# Patient Record
Sex: FEMALE | Race: WHITE | URBAN - METROPOLITAN AREA
[De-identification: names, ages, dates, MRNs, and addresses within clinical notes are randomized per-mention and may not be internally consistent; named-entity substitution may affect disease eponyms.]

---

## 2017-01-10 ENCOUNTER — THERAPY VISIT (OUTPATIENT)
Dept: PHYSICAL THERAPY | Facility: CLINIC | Age: 27
End: 2017-01-10
Payer: COMMERCIAL

## 2017-01-10 DIAGNOSIS — M25.552 HIP PAIN, LEFT: Primary | ICD-10-CM

## 2017-01-10 PROCEDURE — 97530 THERAPEUTIC ACTIVITIES: CPT | Mod: GP | Performed by: PHYSICAL THERAPIST

## 2017-01-10 PROCEDURE — 97110 THERAPEUTIC EXERCISES: CPT | Mod: GP | Performed by: PHYSICAL THERAPIST

## 2017-01-10 PROCEDURE — 97161 PT EVAL LOW COMPLEX 20 MIN: CPT | Mod: GP | Performed by: PHYSICAL THERAPIST

## 2017-01-10 ASSESSMENT — ACTIVITIES OF DAILY LIVING (ADL)
HOW_WOULD_YOU_RATE_YOUR_CURRENT_LEVEL_OF_FUNCTION_DURING_YOUR_USUAL_ACTIVITIES_OF_DAILY_LIVING_FROM_0_TO_100_WITH_100_BEING_YOUR_LEVEL_OF_FUNCTION_PRIOR_TO_YOUR_HIP_PROBLEM_AND_0_BEING_THE_INABILITY_TO_PERFORM_ANY_OF_YOUR_USUAL_DAILY_ACTIVITIES?: 70
HOS_ADL_HIGHEST_POTENTIAL_SCORE: 64
PUTTING_ON_SOCKS_AND_SHOES: SLIGHT DIFFICULTY
STANDING_FOR_15_MINUTES: SLIGHT DIFFICULTY
DEEP_SQUATTING: MODERATE DIFFICULTY
GETTING_INTO_AND_OUT_OF_AN_AVERAGE_CAR: NO DIFFICULTY AT ALL
WALKING_DOWN_STEEP_HILLS: NO DIFFICULTY AT ALL
LIGHT_TO_MODERATE_WORK: NO DIFFICULTY AT ALL
STEPPING_UP_AND_DOWN_CURBS: NO DIFFICULTY AT ALL
HOS_ADL_COUNT: 16
WALKING_15_MINUTES_OR_GREATER: NO DIFFICULTY AT ALL
WALKING_UP_STEEP_HILLS: NO DIFFICULTY AT ALL
GOING_UP_1_FLIGHT_OF_STAIRS: NO DIFFICULTY AT ALL
GOING_DOWN_1_FLIGHT_OF_STAIRS: NO DIFFICULTY AT ALL
RECREATIONAL_ACTIVITIES: NO DIFFICULTY AT ALL
TWISTING/PIVOTING_ON_INVOLVED_LEG: SLIGHT DIFFICULTY
HOS_ADL_SCORE(%): 92.19
WALKING_INITIALLY: NO DIFFICULTY AT ALL
WALKING_APPROXIMATELY_10_MINUTES: NO DIFFICULTY AT ALL
HEAVY_WORK: SLIGHT DIFFICULTY
GETTING_INTO_AND_OUT_OF_A_BATHTUB: NO DIFFICULTY AT ALL
HOS_ADL_ITEM_SCORE_TOTAL: 59
SITTING_FOR_15_MINUTES: SLIGHT DIFFICULTY

## 2017-01-10 NOTE — PROGRESS NOTES
Physical Therapy Initial Examination/Evaluation  January 10, 2017    Ivis Soto is a 26 year old female referred to physical therapy by self-referred for treatment of L hip pain with Precautions/Restrictions/MD instructions E&T    Therapist Impression:   Ivis presents to therapy with pain that is consistent with hip impingement.  Her symptoms may be load related and or functional impingement related.  Our treatment focus will be on hip/core strengthening, education on lifting mechanics and periodization, and correcting single leg squat mechanics.    Subjective:  DOI/onset: Few months ago 10/1/2016  DOS: none  Acute Injury or Gradual Onset?: Gradual injury over time  Mechanism of Injury: Lifting weghts  Related PMH: Hx of hip pain with college Previous Treatment: Stretching, rest foam rolling, lacrosse ball Effect of prior treatment: fair  Imaging: None  Chief Complaint/Functional Limitations:   Weight lifting, sitting and standing pain.  Tweaked back recently as well; hx of back pain and see below in therapy evaluation codes   Pain: rest 0 /10, activity 6/10 Location: Hip flexor and ITB/lateral quad Frequency: Intermittent Described as: Sharp/jabbing Alleviated by: Rest Progression of Symptoms: Unchanged Time of day when pain is worse: Activity related  Sleeping: No issues/uninterrupted   Occupation: Manager  Job duties: prolonged sitting, keyboarding/computer use  Current HEP/exercise regimen: Bodybuilding and powerlifting 3 x week, OH presses, keep lifting heavier, biker  Patient's goals are see chief complaints     Other pertinent PMH/Red Flags: implanted device, anemia   Barriers at home/work: None as reported by patient  Pertinent Surgical History: None  Medications: None as reported by patient  General health as reported by patient: excellent  Return to MD:  prn    HIP EVALUATION    Dynamic Movement Screen  Single leg stance observations: Diffculty with balance eyes closed B  Double limb squat  observations: Good technique/no significant findings  Single limb squat observations: Knee valgus, Hip internal rotation and Contralateral hip drop    Range of Motion  Lumbar spine screen: Positive pain with flexion and stiffness with extension  SI joint screen: Negative      Hip ROM Flexion ER  IR Extension   Left wnl wnl wnl na   Right wnl wnl wnl na      Flexibility Quadriceps Hamstrings Ankle   Left wnl wnl na   Right wnl wnl na     Hip and Knee Strength   MMT Hip Abduction Hip Extension Hip ER Hip IR Knee Flexion   Left 4+/5 4+/5 na/5  na/5 na/5   Right 4+/5 4+/5 na/5  na/5 na/5   Hcun Leg Lowering 5/5    Knee MMT Quadriceps set Straight Leg Raise   Left Good Able   Right Good Able     Special Tests   FADIR HEATHER Log Roll Resisted SLR Parker Prone Figure 4 Kishan's   Left Negative Positive for loss of ROM NA NA Negative NA NA   Right Negative Negative NA NA Negative NA NA     Assessment/Plan:  Patient is a 26 year old female with left side hip complaints.    Patient has the following significant findings with corresponding treatment plan.                Diagnosis 1:  L hip pain  Pain -  hot/cold therapy, manual therapy, splint/taping/bracing/orthotics, self management, education and home program  Decreased ROM/flexibility - manual therapy and therapeutic exercise  Decreased strength - therapeutic exercise and therapeutic activities  Impaired muscle performance - neuro re-education    Therapy Evaluation Codes:   1) History comprised of:   Personal factors that impact the plan of care:      None.    Comorbidity factors that impact the plan of care are:      None.     Medications impacting care: None.  2) Examination of Body Systems comprised of:   Body structures and functions that impact the plan of care:      Pelvis.   Activity limitations that impact the plan of care are:      Lifting.  3) Clinical presentation characteristics are:   Stable/Uncomplicated.  4) Decision-Making    Low complexity using  standardized patient assessment instrument and/or measureable assessment of functional outcome.  Cumulative Therapy Evaluation is: Low complexity.    Previous and current functional limitations:  (See Goal Flow Sheet for this information)    Short term and Long term goals: (See Goal Flow Sheet for this information)     Communication ability:  Patient appears to be able to clearly communicate and understand verbal and written communication and follow directions correctly.  Treatment Explanation - The following has been discussed with the patient:   RX ordered/plan of care  Anticipated outcomes  Possible risks and side effects  This patient would benefit from PT intervention to resume normal activities.   Rehab potential is excellent.    Frequency:  2 X a month, once daily  Duration:  for 3 months  Discharge Plan:  Achieve all LTG.  Independent in home treatment program.  Reach maximal therapeutic benefit.    Please refer to the daily flowsheet for treatment today, total treatment time and time spent performing 1:1 timed codes.

## 2017-01-24 ENCOUNTER — THERAPY VISIT (OUTPATIENT)
Dept: PHYSICAL THERAPY | Facility: CLINIC | Age: 27
End: 2017-01-24
Payer: COMMERCIAL

## 2017-01-24 DIAGNOSIS — M25.552 HIP PAIN, LEFT: Primary | ICD-10-CM

## 2017-01-24 PROCEDURE — 97112 NEUROMUSCULAR REEDUCATION: CPT | Mod: GP | Performed by: PHYSICAL THERAPIST

## 2017-01-24 PROCEDURE — 97110 THERAPEUTIC EXERCISES: CPT | Mod: GP | Performed by: PHYSICAL THERAPIST

## 2017-02-14 ENCOUNTER — THERAPY VISIT (OUTPATIENT)
Dept: PHYSICAL THERAPY | Facility: CLINIC | Age: 27
End: 2017-02-14
Payer: COMMERCIAL

## 2017-02-14 DIAGNOSIS — M25.552 HIP PAIN, LEFT: ICD-10-CM

## 2017-02-14 PROCEDURE — 97530 THERAPEUTIC ACTIVITIES: CPT | Mod: GP | Performed by: PHYSICAL THERAPIST

## 2017-02-14 PROCEDURE — 97110 THERAPEUTIC EXERCISES: CPT | Mod: GP | Performed by: PHYSICAL THERAPIST

## 2017-02-14 NOTE — PROGRESS NOTES
Hip and Knee Strength   MMT Hip Abduction Hip Extension Hip ER Hip IR Knee Flexion   Left 4+/5 5/5 5/5  na/5 na/5   Right 4+/5 5/5 5/5  na/5 na/5

## 2017-02-14 NOTE — MR AVS SNAPSHOT
"              After Visit Summary   2/14/2017    Ivis Soto    MRN: [id number]           Patient Information     Date Of Birth          1990        Visit Information        Provider Department      2/14/2017 11:00 AM Avtar Higuera PT [facility]        Today's Diagnoses     Hip pain, left           Follow-ups after your visit        Your next 10 appointments already scheduled     Mar 07, 2017 11:00 AM CST   DUONG Extremity with Avtar Higuera PT   [facility] ( [facility])    [address] [place] [id number] [phone]              Who to contact     If you have questions or need follow up information about today's clinic visit or your schedule please contact [facility] directly at [phone].  Normal or non-critical lab and imaging results will be communicated to you by [employer], letter or phone within 4 business days after the clinic has received the results. If you do not hear from us within 7 days, please contact the clinic through MyChart or phone. If you have a critical or abnormal lab result, we will notify you by phone as soon as possible.  Submit refill requests through [employer] or call your pharmacy and they will forward the refill request to us. Please allow 3 business days for your refill to be completed.          Additional Information About Your Visit        [employer] Information     [employer] lets you send messages to your doctor, view your test results, renew your prescriptions, schedule appointments and more. To sign up, go to [email or web address] . Click on \"Log in\" on the left side of the screen, which will take you to the Welcome page. Then click on \"Sign up Now\" on the right side of the page.     You will be asked to enter the access code listed below, as well as some personal information. Please follow the directions to create your " username and password.     Your access code is: [email or web address]  Expires: 5/15/2017 12:38 PM     Your access code will  in 90 days. If you need help or a new code, please call your [place] clinic or [phone].        Care EveryWhere ID     This is your Care EveryWhere ID. This could be used by other organizations to access your [place] medical records  [id number]         Blood Pressure from Last 3 Encounters:   No data found for BP    Weight from Last 3 Encounters:   No data found for Wt              We Performed the Following     THERAPEUTIC ACTIVITIES     THERAPEUTIC EXERCISES        Primary Care Provider    None Specified       No primary provider on file.        Thank you!     Thank you for choosing [facility] PHYSICAL THERAPY [place]  for your care. Our goal is always to provide you with excellent care. Hearing back from our patients is one way we can continue to improve our services. Please take a few minutes to complete the written survey that you may receive in the mail after your visit with us. Thank you!             Your Updated Medication List - Protect others around you: Learn how to safely use, store and throw away your medicines at www.disposemymeds.org.      Notice  As of 2017 12:38 PM    You have not been prescribed any medications.

## 2017-03-21 ENCOUNTER — THERAPY VISIT (OUTPATIENT)
Dept: PHYSICAL THERAPY | Facility: CLINIC | Age: 27
End: 2017-03-21
Payer: COMMERCIAL

## 2017-03-21 DIAGNOSIS — M25.552 HIP PAIN, LEFT: ICD-10-CM

## 2017-03-21 PROCEDURE — 97110 THERAPEUTIC EXERCISES: CPT | Mod: GP | Performed by: PHYSICAL THERAPIST

## 2017-03-21 PROCEDURE — 97530 THERAPEUTIC ACTIVITIES: CPT | Mod: GP | Performed by: PHYSICAL THERAPIST

## 2017-03-21 NOTE — MR AVS SNAPSHOT
"              After Visit Summary   3/21/2017    Ivis Soto    MRN: [id number]           Patient Information     Date Of Birth          1990        Visit Information        Provider Department      3/21/2017 11:00 AM Avtar Higuera PT [facility]        Today's Diagnoses     Hip pain, left           Follow-ups after your visit        Who to contact     If you have questions or need follow up information about today's clinic visit or your schedule please contact [facility] directly at [phone].  Normal or non-critical lab and imaging results will be communicated to you by Surveypalhart, letter or phone within 4 business days after the clinic has received the results. If you do not hear from us within 7 days, please contact the clinic through Local[employer] or phone. If you have a critical or abnormal lab result, we will notify you by phone as soon as possible.  Submit refill requests through [employer] or call your pharmacy and they will forward the refill request to us. Please allow 3 business days for your refill to be completed.          Additional Information About Your Visit        MyChart Information     [employer] lets you send messages to your doctor, view your test results, renew your prescriptions, schedule appointments and more. To sign up, go to [email or web address] . Click on \"Log in\" on the left side of the screen, which will take you to the Welcome page. Then click on \"Sign up Now\" on the right side of the page.     You will be asked to enter the access code listed below, as well as some personal information. Please follow the directions to create your username and password.     Your access code is: [email or web address]  Expires: 5/15/2017  1:38 PM     Your access code will  in 90 days. If you need help or a new code, please call your [place] clinic or [phone].        Care EveryWhere ID     This is your Care EveryWhere ID. This " could be used by other organizations to access your [place] medical records  [id number]         Blood Pressure from Last 3 Encounters:   No data found for BP    Weight from Last 3 Encounters:   No data found for Wt              We Performed the Following     THERAPEUTIC ACTIVITIES     THERAPEUTIC EXERCISES        Primary Care Provider    None Specified       No primary provider on file.        Thank you!     Thank you for choosing [facility] PHYSICAL THERAPY [place]  for your care. Our goal is always to provide you with excellent care. Hearing back from our patients is one way we can continue to improve our services. Please take a few minutes to complete the written survey that you may receive in the mail after your visit with us. Thank you!             Your Updated Medication List - Protect others around you: Learn how to safely use, store and throw away your medicines at www.disposemymeds.org.      Notice  As of 3/21/2017 12:55 PM    You have not been prescribed any medications.

## 2017-03-21 NOTE — PROGRESS NOTES
PROGRESS REPORT    Ivis has been in therapy from January 10, 2017 to Mar 21, 2017 for treatment of L hip pain.    Therapist Impression:  Ivis returns to therapy doing very well.  She reports being able to perform back squats with a barbell at the gym without issues.  She feels comfortable progressing on her own and will be attending a weight lifting class in the near future.  We created a home program to emphasize strength/hypertrophy that she will follow over the next 6-8 weeks.  She will e-mail or follow up as needed.    Subjective:  Subjective: Started double leg back squats and going well.  Doing upper body exercises.  Has been travelling over the past few weeks and has been busy.      Objective:  OH squat and dead lift: no obvious findings  HEATHER ROM WNL B    Hip and Knee Strength   MMT Hip Abduction Hip Extension Hip ER Hip IR Knee Flexion   Left 5-/5 5/5 5/5  na/5 na/5   Right 5-/5 5/5 5/5  na/5 na/5        ASSESSMENT/PLAN  Updated problem list and treatment plan: The encounter diagnosis was Hip pain, left. Pain - HEP  Decreased strength - HEP  Impaired muscle performance - HEP  STG/LTGs have been met or progress has been made towards goals:  Yes (See Goal flow sheet completed today.)  Assessment of Progress: The patient's condition is improving.  The patient's condition has potential to improve.  Self Management Plans:  Patient has been instructed in a home treatment program.  Patient is independent in a home treatment program.  Patient  has been instructed in self management of symptoms.  Patient is independent in self management of symptoms.  I have re-evaluated this patient and find that the nature, scope, duration and intensity of the therapy is appropriate for the medical condition of the patient.  Ivis continues to require the following intervention to meet STG and LTG's:  PT intervention is no longer required to meet STG/LTG.    Recommendations:  This patient is ready to be discharged from therapy and  continue their home treatment program.          Please refer to the daily flowsheet for treatment today, total treatment time and time spent performing 1:1 timed codes.          Therapy Home Exercise/Activity Guidelines   Precautions:   Activity Reps/Set X/Week Goal   Targeted Muscle Strength   Single leg bridge / hip thruster 3 x 12-15 3 x week    Leg raise standing / sidelying 2 x 20 3 x week    Functional/Resisted Strength   Squats / Dead lift 2 x 15 3 x week 3 x 10-12   Divehi squats / slider lunge 2 x 15  3 x 10-12   Single leg squat  2 x 15  3 x 10-12         Core   Push ups      Pull ups      Cardio   Bike            Agility   **IN FUTURE CAN ADD IN AGILITY LADDER      Return to Sport

## 2018-10-22 ENCOUNTER — TRANSFERRED RECORDS (OUTPATIENT)
Dept: HEALTH INFORMATION MANAGEMENT | Facility: CLINIC | Age: 28
End: 2018-10-22

## 2018-11-10 ENCOUNTER — OFFICE VISIT (OUTPATIENT)
Dept: ORTHOPEDICS | Facility: CLINIC | Age: 28
End: 2018-11-10
Payer: COMMERCIAL

## 2018-11-10 ENCOUNTER — RADIANT APPOINTMENT (OUTPATIENT)
Dept: GENERAL RADIOLOGY | Facility: CLINIC | Age: 28
End: 2018-11-10
Attending: FAMILY MEDICINE
Payer: COMMERCIAL

## 2018-11-10 VITALS
HEIGHT: 64 IN | BODY MASS INDEX: 21.85 KG/M2 | DIASTOLIC BLOOD PRESSURE: 78 MMHG | SYSTOLIC BLOOD PRESSURE: 111 MMHG | HEART RATE: 79 BPM | WEIGHT: 128 LBS

## 2018-11-10 DIAGNOSIS — M25.522 LEFT ELBOW PAIN: ICD-10-CM

## 2018-11-10 DIAGNOSIS — M25.522 LEFT ELBOW PAIN: Primary | ICD-10-CM

## 2018-11-10 NOTE — MR AVS SNAPSHOT
"              After Visit Summary   11/10/2018    Ivis Soto    MRN: [id number]           Patient Information     Date Of Birth          1990        Visit Information        Provider Department      11/10/2018 9:30 AM Celso Henning DO [facility]        Today's Diagnoses     Left elbow pain    -  1       Follow-ups after your visit        Your next 10 appointments already scheduled     Nov 27, 2018  9:00 AM CST   Return Walk In Ortho with DO SUNI [facility] ([facility])    [address] [facility]  [address] [facility] [address] [phone]              Who to contact     Please call your clinic at [phone] to:    Ask questions about your health    Make or cancel appointments    Discuss your medicines    Learn about your test results    Speak to your doctor            Additional Information About Your Visit        Care EveryWhere ID     This is your Care EveryWhere ID. This could be used by other organizations to access your [place] medical records  [id number]        Your Vitals Were     Pulse Height BMI (Body Mass Index)             79 1.626 m (5' 4\") 21.97 kg/m2          Blood Pressure from Last 3 Encounters:   11/10/18 111/78    Weight from Last 3 Encounters:   11/10/18 58.1 kg (128 lb)               Primary Care Provider    None Specified       No primary provider on file.        Equal Access to Services     SANFORD REED : Shawna liang Sonaga, waaxda luqadaha, qaybta kaalmada adeegyada, oskar denney. So [facility] [phone].    ATENCIÓN: Si habla español, tiene a coley disposición servicios gratavios de asistencia lingüística. Llame al [phone].    We comply with applicable federal civil rights laws and [place] laws. We do not discriminate on the basis of race, color, national origin, age, disability, sex, sexual orientation, or gender " identity.            Thank you!     Thank you for choosing [facility] SPORTS AND ORTHOPAEDIC WALK IN CLINIC  for your care. Our goal is always to provide you with excellent care. Hearing back from our patients is one way we can continue to improve our services. Please take a few minutes to complete the written survey that you may receive in the mail after your visit with us. Thank you!             Your Updated Medication List - Protect others around you: Learn how to safely use, store and throw away your medicines at www.disposemymeds.org.          This list is accurate as of 11/10/18  3:32 PM.  Always use your most recent med list.                   Brand Name Dispense Instructions for use Diagnosis    levonorgestrel 20 MCG/24HR IUD    MIRENA     1 each by Intrauterine route once

## 2018-11-10 NOTE — LETTER
Date:November 12, 2018      Patient was self referred, no letter generated. Do not send.        [facility] Health Information
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
## 2018-11-27 ENCOUNTER — OFFICE VISIT (OUTPATIENT)
Dept: ORTHOPEDICS | Facility: CLINIC | Age: 28
End: 2018-11-27
Payer: COMMERCIAL

## 2018-11-27 ENCOUNTER — RADIANT APPOINTMENT (OUTPATIENT)
Dept: GENERAL RADIOLOGY | Facility: CLINIC | Age: 28
End: 2018-11-27
Attending: FAMILY MEDICINE
Payer: COMMERCIAL

## 2018-11-27 VITALS
DIASTOLIC BLOOD PRESSURE: 78 MMHG | SYSTOLIC BLOOD PRESSURE: 123 MMHG | HEIGHT: 64 IN | WEIGHT: 127 LBS | BODY MASS INDEX: 21.68 KG/M2

## 2018-11-27 DIAGNOSIS — M25.522 LEFT ELBOW PAIN: Primary | ICD-10-CM

## 2018-11-27 DIAGNOSIS — M25.522 LEFT ELBOW PAIN: ICD-10-CM

## 2018-11-27 NOTE — MR AVS SNAPSHOT
"              After Visit Summary   11/27/2018    Ivis Soto    MRN: [id number]           Patient Information     Date Of Birth          1990        Visit Information        Provider Department      11/27/2018 9:00 AM Celso Henning, DO [facility]        Today's Diagnoses     Left elbow pain    -  1       Follow-ups after your visit        Who to contact     Please call your clinic at [phone] to:    Ask questions about your health    Make or cancel appointments    Discuss your medicines    Learn about your test results    Speak to your doctor            Additional Information About Your Visit        Care EveryWhere ID     This is your Care EveryWhere ID. This could be used by other organizations to access your [facility] medical records  [id number]        Your Vitals Were     Height BMI (Body Mass Index)                1.626 m (5' 4\") 21.8 kg/m2           Blood Pressure from Last 3 Encounters:   11/27/18 123/78   11/10/18 111/78    Weight from Last 3 Encounters:   11/27/18 57.6 kg (127 lb)   11/10/18 58.1 kg (128 lb)              Today, you had the following     No orders found for display       Primary Care Provider    None Specified       No primary provider on file.        Equal Access to Services     [facility]: Hadii bobo Mckoy, watayoda sanjuana, qamichaelta kaalayo mcdaniel, oskar hall . So [facility] [phone].    ATENCIÓN: Si habla español, tiene a coley disposición servicios gratuitos de asistencia lingüística. Llame al [phone].    We comply with applicable federal civil rights laws and [place] laws. We do not discriminate on the basis of race, color, national origin, age, disability, sex, sexual orientation, or gender identity.            Thank you!     Thank you for choosing  [facility]  for your care. Our goal is always to provide you with excellent care. Hearing back from our " patients is one way we can continue to improve our services. Please take a few minutes to complete the written survey that you may receive in the mail after your visit with us. Thank you!             Your Updated Medication List - Protect others around you: Learn how to safely use, store and throw away your medicines at www.disposemymeds.org.          This list is accurate as of 11/27/18 10:30 AM.  Always use your most recent med list.                   Brand Name Dispense Instructions for use Diagnosis    levonorgestrel 20 MCG/24HR IUD    MIRENA     1 each by Intrauterine route once

## 2018-11-27 NOTE — PROGRESS NOTES
"ESTABLISHED PATIENT FOLLOW-UP:  RECHECK of the Left Elbow       HISTORY OF PRESENT ILLNESS  Ms. Soto is a pleasant 28 year old year old female who presents to clinic today for follow-up of left elbow pain    Date of injury: 2 weeks ago  Date last seen: 2 weeks ago  Following Therapeutic Plan: Yes  Pain: Patient has improvement of pain, mild twinges of pain at lateral elbow, radiocapitellar joint with supination and pronation, rare. No catching, locking or instability.   Function: Improved, lacking mild extension  Interval History: Patient doing well overall.  Feels pain has markedly improved over the last 2 weeks.  Flexion is full, good supination/pronation.  Mild pain of lateral elbow with supination.  Has avoided lifting, yoga, pushups and pullups.  No other issues.       MEDICAL HISTORY  Patient Active Problem List   Diagnosis   (none) - all problems resolved or deleted       Current Outpatient Prescriptions   Medication Sig Dispense Refill     levonorgestrel (MIRENA) 20 MCG/24HR IUD 1 each by Intrauterine route once         No Known Allergies    No family history on file.    Additional medical/Social/Surgical histories reviewed in [facility] and updated as appropriate.     REVIEW OF SYSTEMS (11/27/2018)  CONSTITUTIONAL: Denies fever and weight loss  EYES: Denies acute vision changes  ENT: Denies hearing changes or difficulty swallowing  CARDIAC: Denies chest pain or edema  RESPIRATORY: Denies dyspnea, cough or wheeze  GASTROINTESTINAL: Denies abdominal pain, nausea, vomiting  MUSCULOSKELETAL: See HPI  SKIN: Denies any recent rash or lesion  NEUROLOGICAL: Denies numbness or focal weakness  PSYCHIATRIC: No history of psychiatric symptoms or problems  ENDOCRINE: Denies current diagnosis of diabetes   HEMATOLOGY: Denies episodes of easy bleeding     PHYSICAL EXAM  /78  Ht 1.626 m (5' 4\")  Wt 57.6 kg (127 lb)  BMI 21.8 kg/m2    GEN: AOx4, NAD  PSYCH: appropriate mood and affect  Musculoskeletal - Left " elbow  - inspection: normal joint alignment, no obvious deformity, no swelling  - palpation: TTP at radiocapitellar joint lateral elbow. Nontender lateral or medial epicondyle. Nontender soft tissue. Nontender olecranon or medial elbow  - ROM:  140 deg flexion   8 deg extension   90 deg pronation   90 deg supination  - strength: 5/5  strength, 5/5 flexion, extension, pronation, supination  - special tests:  (-) varus  (-) valgus  (-) Tinel's  Neuro  - no sensory or motor deficit, grossly normal coordination, normal muscle tone  Skin  - no ecchymosis, erythema, warmth, or induration, no obvious rash  Psych  - interactive, appropriate, normal mood and affect    IMAGING : XR left elbow 3v Final results and radiologist's interpretation, available in the [facility] health record. Images were reviewed with the patient/family members in the office today. My personal interpretation of the performed imaging is no acute osseous abnormality, no evidence of healing fracture or callus.     ASSESSMENT & PLAN  Ms. Soto is a 28 year old year old female who presents to clinic today with RECHECK of the Left Elbow  Discussed that she may have injury at radiocapitellar joint or subtle occult fracture.  NO evidence of this on repeat XR so fracture is less likely.  Doing well overall and mild limitation in full extension with no significant pain on exam.    Encouraged ROM exercises, gradually returning to yoga, biking as tolerated - pain free.     Follow up in 4 weeks if ROM does not improve to full or limited by certain activity.  In the instance of locking, catching or pain - MRI warranted.     It was a pleasure seeing Ivis.    Celso Henning, , [facility]  Primary Care Sports Medicine

## 2018-11-27 NOTE — LETTER
11/27/2018       RE: Ivis Soto  [address]  [place] [address]     Dear Colleague,    Thank you for referring your patient, Ivis Soto, to the [facility] at [facility]. Please see a copy of my visit note below.          SPORTS & ORTHOPEDIC WALK-IN FOLLOW-UP VISIT 11/27/2018    Interval History:     Follow up reason: Left elbow pain    Date of injury: 11/8/18    Date last seen: 11/10/18    Following Therapeutic Plan: Yes     Pain: Improving    Function: Improving    Interval History:  She still has pain/tightness in her anterior elbow with extension on her arm. Pain has overall decreased significantly.     Medical History:    Any recent changes to your medical history? No    Any new medication prescribed since last visit? No    Review of Systems:    Do you have fever, chills, weight loss? No    Do you have any vision problems? No    Do you have any chest pain or edema? No    Do you have any shortness of breath or wheezing?  No    Do you have stomach problems? No    Do you have any numbness or focal weakness? No    Do you have diabetes? No    Do you have problems with bleeding or clotting? No    Do you have an rashes or other skin lesions? No             ESTABLISHED PATIENT FOLLOW-UP:  RECHECK of the Left Elbow       HISTORY OF PRESENT ILLNESS  Ms. Soto is a pleasant 28 year old year old female who presents to clinic today for follow-up of left elbow pain    Date of injury: 2 weeks ago  Date last seen: 2 weeks ago  Following Therapeutic Plan: Yes  Pain: Patient has improvement of pain, mild twinges of pain at lateral elbow, radiocapitellar joint with supination and pronation, rare. No catching, locking or instability.   Function: Improved, lacking mild extension  Interval History: Patient doing well overall.  Feels pain has markedly improved over the last 2 weeks.  Flexion is full, good supination/pronation.  Mild  "pain of lateral elbow with supination.  Has avoided lifting, yoga, pushups and pullups.  No other issues.       MEDICAL HISTORY  Patient Active Problem List   Diagnosis   (none) - all problems resolved or deleted       Current Outpatient Prescriptions   Medication Sig Dispense Refill     levonorgestrel (MIRENA) 20 MCG/24HR IUD 1 each by Intrauterine route once         No Known Allergies    No family history on file.    Additional medical/Social/Surgical histories reviewed in [facility] and updated as appropriate.     REVIEW OF SYSTEMS (11/27/2018)  CONSTITUTIONAL: Denies fever and weight loss  EYES: Denies acute vision changes  ENT: Denies hearing changes or difficulty swallowing  CARDIAC: Denies chest pain or edema  RESPIRATORY: Denies dyspnea, cough or wheeze  GASTROINTESTINAL: Denies abdominal pain, nausea, vomiting  MUSCULOSKELETAL: See HPI  SKIN: Denies any recent rash or lesion  NEUROLOGICAL: Denies numbness or focal weakness  PSYCHIATRIC: No history of psychiatric symptoms or problems  ENDOCRINE: Denies current diagnosis of diabetes   HEMATOLOGY: Denies episodes of easy bleeding     PHYSICAL EXAM  /78  Ht 1.626 m (5' 4\")  Wt 57.6 kg (127 lb)  BMI 21.8 kg/m2    GEN: AOx4, NAD  PSYCH: appropriate mood and affect  Musculoskeletal - Left elbow  - inspection: normal joint alignment, no obvious deformity, no swelling  - palpation: TTP at radiocapitellar joint lateral elbow. Nontender lateral or medial epicondyle. Nontender soft tissue. Nontender olecranon or medial elbow  - ROM:  140 deg flexion   8 deg extension   90 deg pronation   90 deg supination  - strength: 5/5  strength, 5/5 flexion, extension, pronation, supination  - special tests:  (-) varus  (-) valgus  (-) Tinel's  Neuro  - no sensory or motor deficit, grossly normal coordination, normal muscle tone  Skin  - no ecchymosis, erythema, warmth, or induration, no obvious rash  Psych  - interactive, appropriate, normal mood and affect    IMAGING : XR " left elbow 3v Final results and radiologist's interpretation, available in the [facility] health record. Images were reviewed with the patient/family members in the office today. My personal interpretation of the performed imaging is no acute osseous abnormality, no evidence of healing fracture or callus.     ASSESSMENT & PLAN  Ms. Soto is a 28 year old year old female who presents to clinic today with RECHECK of the Left Elbow  Discussed that she may have injury at radiocapitellar joint or subtle occult fracture.  NO evidence of this on repeat XR so fracture is less likely.  Doing well overall and mild limitation in full extension with no significant pain on exam.    Encouraged ROM exercises, gradually returning to yoga, biking as tolerated - pain free.     Follow up in 4 weeks if ROM does not improve to full or limited by certain activity.  In the instance of locking, catching or pain - MRI warranted.     It was a pleasure seeing Michelle Henning DO, [facility]  Primary Care Sports Medicine        Again, thank you for allowing me to participate in the care of your patient.      Sincerely,    Celso Henning DO

## 2018-11-27 NOTE — LETTER
Date:November 28, 2018      Patient was self referred, no letter generated. Do not send.        [facility] Health Information

## 2018-11-27 NOTE — PROGRESS NOTES
SPORTS & ORTHOPEDIC WALK-IN FOLLOW-UP VISIT 11/27/2018    Interval History:     Follow up reason: Left elbow pain    Date of injury: 11/8/18    Date last seen: 11/10/18    Following Therapeutic Plan: Yes     Pain: Improving    Function: Improving    Interval History:  She still has pain/tightness in her anterior elbow with extension on her arm. Pain has overall decreased significantly.     Medical History:    Any recent changes to your medical history? No    Any new medication prescribed since last visit? No    Review of Systems:    Do you have fever, chills, weight loss? No    Do you have any vision problems? No    Do you have any chest pain or edema? No    Do you have any shortness of breath or wheezing?  No    Do you have stomach problems? No    Do you have any numbness or focal weakness? No    Do you have diabetes? No    Do you have problems with bleeding or clotting? No    Do you have an rashes or other skin lesions? No

## 2019-08-28 ENCOUNTER — OFFICE VISIT (OUTPATIENT)
Dept: FAMILY MEDICINE | Facility: CLINIC | Age: 29
End: 2019-08-28
Payer: COMMERCIAL

## 2019-08-28 VITALS
DIASTOLIC BLOOD PRESSURE: 63 MMHG | HEIGHT: 63 IN | BODY MASS INDEX: 22.01 KG/M2 | TEMPERATURE: 98.9 F | HEART RATE: 65 BPM | WEIGHT: 124.2 LBS | SYSTOLIC BLOOD PRESSURE: 108 MMHG | RESPIRATION RATE: 16 BRPM | OXYGEN SATURATION: 96 %

## 2019-08-28 DIAGNOSIS — Z00.01 ENCOUNTER FOR ROUTINE ADULT HEALTH EXAMINATION WITH ABNORMAL FINDINGS: Primary | ICD-10-CM

## 2019-08-28 DIAGNOSIS — N64.4 BREAST TENDERNESS IN FEMALE: ICD-10-CM

## 2019-08-28 DIAGNOSIS — L72.9 SUBCUTANEOUS CYST: ICD-10-CM

## 2019-08-28 DIAGNOSIS — Z97.5 IUD (INTRAUTERINE DEVICE) IN PLACE: ICD-10-CM

## 2019-08-28 RX ORDER — MULTIVIT-MIN/IRON/FOLIC ACID/K 18-600-40
CAPSULE ORAL
COMMUNITY

## 2019-08-28 RX ORDER — MULTIVITAMIN,THERAPEUTIC
1 TABLET ORAL DAILY
COMMUNITY

## 2019-08-28 ASSESSMENT — ANXIETY QUESTIONNAIRES
5. BEING SO RESTLESS THAT IT IS HARD TO SIT STILL: NOT AT ALL
1. FEELING NERVOUS, ANXIOUS, OR ON EDGE: MORE THAN HALF THE DAYS
7. FEELING AFRAID AS IF SOMETHING AWFUL MIGHT HAPPEN: NOT AT ALL
6. BECOMING EASILY ANNOYED OR IRRITABLE: SEVERAL DAYS
GAD7 TOTAL SCORE: 5
2. NOT BEING ABLE TO STOP OR CONTROL WORRYING: SEVERAL DAYS
IF YOU CHECKED OFF ANY PROBLEMS ON THIS QUESTIONNAIRE, HOW DIFFICULT HAVE THESE PROBLEMS MADE IT FOR YOU TO DO YOUR WORK, TAKE CARE OF THINGS AT HOME, OR GET ALONG WITH OTHER PEOPLE: SOMEWHAT DIFFICULT
3. WORRYING TOO MUCH ABOUT DIFFERENT THINGS: NOT AT ALL

## 2019-08-28 ASSESSMENT — PATIENT HEALTH QUESTIONNAIRE - PHQ9
SUM OF ALL RESPONSES TO PHQ QUESTIONS 1-9: 6
5. POOR APPETITE OR OVEREATING: SEVERAL DAYS

## 2019-08-28 ASSESSMENT — MIFFLIN-ST. JEOR: SCORE: 1273.62

## 2019-08-28 NOTE — PROGRESS NOTES
"New Patient Note-Women         HPI       Concerns today:     1) Worried about possible \"saddle sore\". Raised lump under skin that gets bigger the more she biges. Doesn't hurt with palpation. Sometimes shifting in seat will cause it to be pinched and cause pain. Never had anything like this previously.     2) Has noticed some left breast tenderness. Doesn't have a period with her Mirena. Doesn't notice if the tenderness is related to her menses or not. No lumps.      Patient Active Problem List   Diagnosis   (none) - all problems resolved or deleted       History reviewed. No pertinent past medical history.    Previous Medical Care   Family [facility]     Family History   Problem Relation Age of Onset     Charcot-Stacey-Tooth disease Mother      Thyroid Disease Mother      Hypertension Father      Diabetes Maternal Grandmother      Cerebrovascular Disease Maternal Grandfather             Review of Systems:     Review of Systems:  CONSTITUTIONAL: NEGATIVE for fever, chills, change in weight  INTEGUMENTARY/SKIN: Bump on groin  EYES: NEGATIVE for vision changes or irritation  ENT/MOUTH: NEGATIVE for ear, mouth and throat problems  RESP: NEGATIVE for significant cough or SOB  BREAST: Left breast tenderness.   CV: NEGATIVE for chest pain, palpitations or peripheral edema  GI: NEGATIVE for nausea, abdominal pain, heartburn, or change in bowel habits  : NEGATIVE for frequency, dysuria, or hematuria  MUSCULOSKELETAL: NEGATIVE for significant arthralgias or myalgia  NEURO: NEGATIVE for weakness, dizziness or paresthesias  ENDOCRINE: NEGATIVE for temperature intolerance, skin/hair changes  HEME/ALLERGY: NEGATIVE for bleeding problems  PSYCHIATRIC: NEGATIVE for changes in mood or affect    Sleep:   Do you snore most or the night (as reported by a family member)? No  Do you feel sleepy or extremely tired during most of the day? Yes, but thinks related to needing to get up early for work.            Social History     Social " "History     Tobacco Use     Smoking status: Never Smoker     Smokeless tobacco: Never Used   Substance Use Topics     Alcohol use: Yes     Comment: ocassionally     Has anyone hurt you physically, for example by pushing, hitting, slapping or kicking you or forcing you to have sex? Denies  Do you feel threatened or controlled by a partner, ex-partner or anyone in your life? Denies    Sexual Health     Sexual concerns: No   STI History: Neg  Pregnancy History: No obstetric history on file.  LMP No LMP recorded (lmp unknown). (Menstrual status: IUD). Spots every other month since getting Mirena in 2016.   Last Pap Smear Date: No results found for: PAP  Abnormal Pap History: No history of abnormal pap. Unsure of when her last pap was, thinks within the past 3 years at [facility].          Physical Exam:     Vitals: /63 (BP Location: Left arm, Patient Position: Sitting, Cuff Size: Adult Regular)   Pulse 65   Temp 98.9  F (37.2  C) (Oral)   Resp 16   Ht 1.61 m (5' 3.39\")   Wt 56.3 kg (124 lb 3.2 oz)   LMP  (LMP Unknown)   SpO2 96%   BMI 21.73 kg/m    BMI= Body mass index is 21.73 kg/m .     GENERAL: healthy, alert, well nourished, well hydrated, no distress  EYES: Eyes grossly normal to inspection, extraocular movements - intact, and PERRL  HENT: ear canals- normal;Nose- normal; Mouth- no ulcers, no lesions  NECK: no tenderness, no adenopathy, no asymmetry, no masses, no stiffness; thyroid- normal to palpation  RESP: lungs clear to auscultation - no rales, no rhonchi, no wheezes  CV: regular rates and rhythm, normal S1 S2, no S3 or S4 and no murmur, no click or rub -  ABDOMEN: soft, no tenderness, no  hepatosplenomegaly, no masses, normal bowel sounds  MS: extremities- no gross deformities noted, no edema  SKIN: no suspicious lesions, no rashes  : In the R inguinal crease, posteriorly, is a 0.5cm palpable nodule which is nontender to palpation. No overlying warmth or erythema.   NEURO: strength and " tone- normal, sensory exam- grossly normal, mentation- intact, speech- normal  PSYCH: Alert and oriented times 3; speech- coherent , normal rate and volume; able to articulate logical thoughts, able to abstract reason, no tangential thoughts, no hallucinations or delusions, affect- normal    Assessment and Plan      Ivis was seen today for establish care.    Diagnoses and all orders for this visit:    Encounter for routine adult health examination with abnormal findings  Routine physical done today. Will obtain regarding regard pap smear and call patient with findings to determine when next pap smear is needed.   Agreeable to Tdap today.   -     TDAP VACCINE (BOOSTRIX)    Subcutaneous cyst  Lump she noticed is likely a small sebaceous cyst. Discussed symptomatic treatment - warm packs if inflammed, changing out of bike shorts when able. Advised of signs of infection and reasons to return. If becomes more bothersome, consider surgery referral for excision.     Breast tenderness in female  She will monitor her symptoms to see if related to menses, will return if persistent or develops new breast lump. No Fhx of breast cancer noted.       Options for treatment and follow-up care were reviewed with the patient . Ivis Soto  engaged in the decision making process and verbalized understanding of the options discussed and agreed with the final plan.    Svitlana Figueroa DO

## 2019-08-29 ASSESSMENT — ANXIETY QUESTIONNAIRES: GAD7 TOTAL SCORE: 5

## 2019-08-30 PROBLEM — Z97.5 IUD (INTRAUTERINE DEVICE) IN PLACE: Status: ACTIVE | Noted: 2019-08-30

## 2020-03-10 ENCOUNTER — HEALTH MAINTENANCE LETTER (OUTPATIENT)
Age: 30
End: 2020-03-10
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
## 2020-12-27 ENCOUNTER — HEALTH MAINTENANCE LETTER (OUTPATIENT)
Age: 30
End: 2020-12-27

## 2021-10-09 ENCOUNTER — HEALTH MAINTENANCE LETTER (OUTPATIENT)
Age: 31
End: 2021-10-09

## 2021-12-10 ENCOUNTER — OFFICE VISIT (OUTPATIENT)
Dept: FAMILY MEDICINE | Facility: CLINIC | Age: 31
End: 2021-12-10
Payer: COMMERCIAL

## 2021-12-10 VITALS
HEIGHT: 63 IN | BODY MASS INDEX: 21.97 KG/M2 | TEMPERATURE: 98.1 F | WEIGHT: 124 LBS | OXYGEN SATURATION: 96 % | DIASTOLIC BLOOD PRESSURE: 73 MMHG | HEART RATE: 58 BPM | SYSTOLIC BLOOD PRESSURE: 107 MMHG | RESPIRATION RATE: 16 BRPM

## 2021-12-10 DIAGNOSIS — Z48.02 VISIT FOR SUTURE REMOVAL: Primary | ICD-10-CM

## 2021-12-10 PROCEDURE — 99207 PR NO CHARGE LOS: CPT | Mod: 25 | Performed by: FAMILY MEDICINE

## 2021-12-10 PROCEDURE — S0630 REMOVAL OF SUTURES: HCPCS | Performed by: FAMILY MEDICINE

## 2021-12-10 ASSESSMENT — MIFFLIN-ST. JEOR: SCORE: 1256.46

## 2021-12-10 NOTE — PROGRESS NOTES
[facility]  [facility]    Ivis Soto is a patient here for suture removal  Indication: chin laceration    Consent: Verbal consent obtained. Patient's questions were elicited and answered.     Preoperative Diagnosis: Chin laceration  Postoperative Diagnosis: same    Technique:   Skin cleaned with alcohol swab. Five sutures visualized. Laceration appears well approximated and healing appropriately. All sutures removed completely.  EBL: none  Complications:  No  Tolerance:  Pt tolerated procedure well and was in stable condition.     Follow up: Pt was instructed to call if bleeding, severe pain or foul smell.     Lexy Andino, DO         English

## 2022-01-29 ENCOUNTER — HEALTH MAINTENANCE LETTER (OUTPATIENT)
Age: 32
End: 2022-01-29

## 2022-09-11 ENCOUNTER — HEALTH MAINTENANCE LETTER (OUTPATIENT)
Age: 32
End: 2022-09-11

## 2023-05-06 ENCOUNTER — HEALTH MAINTENANCE LETTER (OUTPATIENT)
Age: 33
End: 2023-05-06

## 2024-07-13 ENCOUNTER — HEALTH MAINTENANCE LETTER (OUTPATIENT)
Age: 34
End: 2024-07-13